# Patient Record
Sex: FEMALE | Race: OTHER
[De-identification: names, ages, dates, MRNs, and addresses within clinical notes are randomized per-mention and may not be internally consistent; named-entity substitution may affect disease eponyms.]

---

## 2018-01-29 ENCOUNTER — HOSPITAL ENCOUNTER (INPATIENT)
Dept: HOSPITAL 54 - ER | Age: 73
LOS: 3 days | Discharge: HOME | DRG: 720 | End: 2018-02-01
Attending: INTERNAL MEDICINE | Admitting: INTERNAL MEDICINE
Payer: MEDICAID

## 2018-01-29 VITALS — WEIGHT: 125.38 LBS | HEIGHT: 64 IN | BODY MASS INDEX: 21.4 KG/M2

## 2018-01-29 VITALS — DIASTOLIC BLOOD PRESSURE: 61 MMHG | SYSTOLIC BLOOD PRESSURE: 100 MMHG

## 2018-01-29 DIAGNOSIS — E11.22: ICD-10-CM

## 2018-01-29 DIAGNOSIS — J18.9: ICD-10-CM

## 2018-01-29 DIAGNOSIS — I95.9: ICD-10-CM

## 2018-01-29 DIAGNOSIS — N17.0: ICD-10-CM

## 2018-01-29 DIAGNOSIS — E86.1: ICD-10-CM

## 2018-01-29 DIAGNOSIS — I10: ICD-10-CM

## 2018-01-29 DIAGNOSIS — N10: ICD-10-CM

## 2018-01-29 DIAGNOSIS — I12.9: ICD-10-CM

## 2018-01-29 DIAGNOSIS — Z90.49: ICD-10-CM

## 2018-01-29 DIAGNOSIS — N18.9: ICD-10-CM

## 2018-01-29 DIAGNOSIS — K80.20: ICD-10-CM

## 2018-01-29 DIAGNOSIS — Z79.84: ICD-10-CM

## 2018-01-29 DIAGNOSIS — E87.1: ICD-10-CM

## 2018-01-29 DIAGNOSIS — Z79.899: ICD-10-CM

## 2018-01-29 DIAGNOSIS — B96.20: ICD-10-CM

## 2018-01-29 DIAGNOSIS — A41.9: Primary | ICD-10-CM

## 2018-01-29 DIAGNOSIS — E11.9: ICD-10-CM

## 2018-01-29 DIAGNOSIS — E83.42: ICD-10-CM

## 2018-01-29 DIAGNOSIS — E78.5: ICD-10-CM

## 2018-01-29 DIAGNOSIS — D63.8: ICD-10-CM

## 2018-01-29 LAB
ALBUMIN SERPL BCP-MCNC: 3.6 G/DL (ref 3.4–5)
ALP SERPL-CCNC: 94 U/L (ref 46–116)
ALT SERPL W P-5'-P-CCNC: 46 U/L (ref 12–78)
APPEARANCE UR: (no result)
APTT PPP: 26 SEC (ref 23–34)
AST SERPL W P-5'-P-CCNC: 36 U/L (ref 15–37)
BASOPHILS # BLD AUTO: 0 /CMM (ref 0–0.2)
BASOPHILS NFR BLD AUTO: 0.1 % (ref 0–2)
BILIRUB DIRECT SERPL-MCNC: 0.4 MG/DL (ref 0–0.2)
BILIRUB SERPL-MCNC: 1.1 MG/DL (ref 0.2–1)
BILIRUB UR QL STRIP: NEGATIVE
BUN SERPL-MCNC: 25 MG/DL (ref 7–18)
CALCIUM SERPL-MCNC: 10 MG/DL (ref 8.5–10.1)
CHLORIDE SERPL-SCNC: 97 MMOL/L (ref 98–107)
CO2 SERPL-SCNC: 27 MMOL/L (ref 21–32)
COLOR UR: YELLOW
CREAT SERPL-MCNC: 1.8 MG/DL (ref 0.6–1.3)
EOSINOPHIL # BLD AUTO: 0 /CMM (ref 0–0.7)
EOSINOPHIL NFR BLD AUTO: 0.1 % (ref 0–6)
GLUCOSE SERPL-MCNC: 209 MG/DL (ref 74–106)
GLUCOSE UR STRIP-MCNC: (no result) MG/DL
HCT VFR BLD AUTO: 40 % (ref 33–45)
HGB BLD-MCNC: 13.5 G/DL (ref 11.5–14.8)
HGB UR QL STRIP: (no result) ERY/UL
INR PPP: 0.97 (ref 0.87–1.13)
KETONES UR STRIP-MCNC: (no result) MG/DL
LEUKOCYTE ESTERASE UR QL STRIP: NEGATIVE
LIPASE SERPL-CCNC: 149 U/L (ref 73–393)
LYMPHOCYTES NFR BLD AUTO: 0.6 /CMM (ref 0.8–4.8)
LYMPHOCYTES NFR BLD AUTO: 2.8 % (ref 20–44)
LYMPHOCYTES NFR BLD MANUAL: 4 % (ref 16–48)
MCH RBC QN AUTO: 30 PG (ref 26–33)
MCHC RBC AUTO-ENTMCNC: 34 G/DL (ref 31–36)
MCV RBC AUTO: 88 FL (ref 82–100)
MONOCYTES NFR BLD AUTO: 0.5 /CMM (ref 0.1–1.3)
MONOCYTES NFR BLD AUTO: 2.5 % (ref 2–12)
MONOCYTES NFR BLD MANUAL: 7 % (ref 0–11)
NEUTROPHILS # BLD AUTO: 19.2 /CMM (ref 1.8–8.9)
NEUTROPHILS NFR BLD AUTO: 94.5 % (ref 43–81)
NEUTS BAND NFR BLD MANUAL: 14 % (ref 0–5)
NEUTS SEG NFR BLD MANUAL: 75 % (ref 42–76)
NITRITE UR QL STRIP: POSITIVE
PH UR STRIP: 7.5 [PH] (ref 5–8)
PLATELET # BLD AUTO: 230 /CMM (ref 150–450)
POTASSIUM SERPL-SCNC: 3.5 MMOL/L (ref 3.5–5.1)
PROT SERPL-MCNC: 8 G/DL (ref 6.4–8.2)
PROT UR QL STRIP: (no result) MG/DL
RBC # BLD AUTO: 4.56 MIL/UL (ref 4–5.2)
RDW COEFFICIENT OF VARIATION: 12.1 (ref 11.5–15)
SODIUM SERPL-SCNC: 132 MMOL/L (ref 136–145)
UROBILINOGEN UR STRIP-MCNC: 1 EU/DL
WBC #/AREA URNS HPF: (no result) /HPF (ref 0–3)
WBC NRBC COR # BLD AUTO: 20.3 K/UL (ref 4.3–11)

## 2018-01-29 PROCEDURE — A4606 OXYGEN PROBE USED W OXIMETER: HCPCS

## 2018-01-29 PROCEDURE — Z7610: HCPCS

## 2018-01-29 RX ADMIN — PIPERACILLIN SODIUM AND TAZOBACTAM SODIUM SCH MLS/HR: .25; 2 INJECTION, POWDER, LYOPHILIZED, FOR SOLUTION INTRAVENOUS at 23:47

## 2018-01-29 RX ADMIN — SODIUM CHLORIDE PRN MLS/HR: 9 INJECTION, SOLUTION INTRAVENOUS at 20:48

## 2018-01-29 RX ADMIN — Medication SCH EACH: at 23:47

## 2018-01-29 RX ADMIN — ENOXAPARIN SODIUM SCH MG: 30 INJECTION SUBCUTANEOUS at 20:49

## 2018-01-29 NOTE — NUR
RECEIVED REPORT FROM AM SHIFT RN MIR. PT RESTING IN BED QUIETLY, NO ACUTE 
DISTRESS NOTED, RESP EVEN AND UNLABORED. PT DENIES PAIN OR DISCOMFORT AT THIS 
TIME. PT VERBALIZE RELIEF OF N/V. CALL MAIRA WHEELER. WILL CONTINUE TO 
MONITOR PT CLOSELY.

## 2018-01-29 NOTE — NUR
Patient is resting comfortably in bed with eyes closed. Easily aroused. VSS. 
REPORT GIVEN TO PM NURSES FOR BALJINDER

## 2018-01-29 NOTE — NUR
tele rn note



received patient awake alert and oriented. Patient denies any pain or discomfort at this 
time. IV site intact with no redness noted. Skin intact, with no breakdown or bruising 
noted. All belongings checked and accounted for. Oriented patient to room and to unit. 
Waiting for md orders. Bed locked and in lowest position. Side rails up, call light within 
reach. Will continue to monitor.

## 2018-01-29 NOTE — NUR
ALEXANDER FROM HOME DT ALTERED MENTAL STATUS. PATIENT RECEIVED AWAKE AND ALERT. 
APPEARS IN NO DISTRESS. RESPIRATION EVEN AND UNLABORED. PATIENT ABLE TO 
VERBALLY RESPONSIVE HOWEVER PATIENT RESPONDS SLOW. PATIENT REMAINS LETHRGIC. 
PER PT SHE HAS 1 WPIODE OF VOMITTING AND FELT DIZZY APPROXIMATELY 1.5 HR AGO. 
PATIENT IS SATING WELL ON ROOM AIR, AFEBRILE. VSS. GOWNED PT AND PLACED ON TELE 
MONITOR, PENDING MD EVALUATUION

## 2018-01-30 VITALS — DIASTOLIC BLOOD PRESSURE: 59 MMHG | SYSTOLIC BLOOD PRESSURE: 124 MMHG

## 2018-01-30 VITALS — DIASTOLIC BLOOD PRESSURE: 71 MMHG | SYSTOLIC BLOOD PRESSURE: 138 MMHG

## 2018-01-30 VITALS — SYSTOLIC BLOOD PRESSURE: 98 MMHG | DIASTOLIC BLOOD PRESSURE: 53 MMHG

## 2018-01-30 VITALS — SYSTOLIC BLOOD PRESSURE: 100 MMHG | DIASTOLIC BLOOD PRESSURE: 56 MMHG

## 2018-01-30 VITALS — SYSTOLIC BLOOD PRESSURE: 116 MMHG | DIASTOLIC BLOOD PRESSURE: 58 MMHG

## 2018-01-30 LAB
BASOPHILS # BLD AUTO: 0 /CMM (ref 0–0.2)
BASOPHILS NFR BLD AUTO: 0.2 % (ref 0–2)
BUN SERPL-MCNC: 29 MG/DL (ref 7–18)
CALCIUM SERPL-MCNC: 8.8 MG/DL (ref 8.5–10.1)
CHLORIDE SERPL-SCNC: 103 MMOL/L (ref 98–107)
CHOLEST SERPL-MCNC: 157 MG/DL (ref ?–200)
CO2 SERPL-SCNC: 24 MMOL/L (ref 21–32)
CREAT SERPL-MCNC: 2.2 MG/DL (ref 0.6–1.3)
CREAT UR-MCNC: 40.5 MG/DL (ref 30–125)
EOSINOPHIL # BLD AUTO: 0.1 /CMM (ref 0–0.7)
EOSINOPHIL NFR BLD AUTO: 0.6 % (ref 0–6)
GLUCOSE SERPL-MCNC: 59 MG/DL (ref 74–106)
HCT VFR BLD AUTO: 34 % (ref 33–45)
HDLC SERPL-MCNC: 57 MG/DL (ref 40–60)
HGB BLD-MCNC: 11.8 G/DL (ref 11.5–14.8)
LDLC SERPL DIRECT ASSAY-MCNC: 90 MG/DL (ref 0–99)
LYMPHOCYTES NFR BLD AUTO: 10.9 % (ref 20–44)
LYMPHOCYTES NFR BLD AUTO: 2.1 /CMM (ref 0.8–4.8)
MAGNESIUM SERPL-MCNC: 1.9 MG/DL (ref 1.8–2.4)
MCH RBC QN AUTO: 30 PG (ref 26–33)
MCHC RBC AUTO-ENTMCNC: 34 G/DL (ref 31–36)
MCV RBC AUTO: 88 FL (ref 82–100)
MONOCYTES NFR BLD AUTO: 1.1 /CMM (ref 0.1–1.3)
MONOCYTES NFR BLD AUTO: 5.8 % (ref 2–12)
NEUTROPHILS # BLD AUTO: 16 /CMM (ref 1.8–8.9)
NEUTROPHILS NFR BLD AUTO: 82.5 % (ref 43–81)
PHOSPHATE SERPL-MCNC: 4.2 MG/DL (ref 2.5–4.9)
PLATELET # BLD AUTO: 203 /CMM (ref 150–450)
POTASSIUM SERPL-SCNC: 3.8 MMOL/L (ref 3.5–5.1)
RBC # BLD AUTO: 3.9 MIL/UL (ref 4–5.2)
RDW COEFFICIENT OF VARIATION: 12.7 (ref 11.5–15)
SODIUM SERPL-SCNC: 137 MMOL/L (ref 136–145)
SODIUM UR-SCNC: 99 MMOL/L (ref 40–220)
TRIGL SERPL-MCNC: 122 MG/DL (ref 30–150)
WBC NRBC COR # BLD AUTO: 19.4 K/UL (ref 4.3–11)

## 2018-01-30 RX ADMIN — SODIUM CHLORIDE PRN MLS/HR: 9 INJECTION, SOLUTION INTRAVENOUS at 10:48

## 2018-01-30 RX ADMIN — CLONIDINE HYDROCHLORIDE SCH MG: 0.1 TABLET ORAL at 09:17

## 2018-01-30 RX ADMIN — PIPERACILLIN SODIUM AND TAZOBACTAM SODIUM SCH MLS/HR: .25; 2 INJECTION, POWDER, LYOPHILIZED, FOR SOLUTION INTRAVENOUS at 23:37

## 2018-01-30 RX ADMIN — Medication SCH EACH: at 09:17

## 2018-01-30 RX ADMIN — CLONIDINE HYDROCHLORIDE SCH MG: 0.1 TABLET ORAL at 17:43

## 2018-01-30 RX ADMIN — PIPERACILLIN SODIUM AND TAZOBACTAM SODIUM SCH MLS/HR: .25; 2 INJECTION, POWDER, LYOPHILIZED, FOR SOLUTION INTRAVENOUS at 05:26

## 2018-01-30 RX ADMIN — Medication SCH EACH: at 22:07

## 2018-01-30 RX ADMIN — PIPERACILLIN SODIUM AND TAZOBACTAM SODIUM SCH MLS/HR: .25; 2 INJECTION, POWDER, LYOPHILIZED, FOR SOLUTION INTRAVENOUS at 12:37

## 2018-01-30 RX ADMIN — ENOXAPARIN SODIUM SCH MG: 30 INJECTION SUBCUTANEOUS at 22:07

## 2018-01-30 RX ADMIN — PIPERACILLIN SODIUM AND TAZOBACTAM SODIUM SCH MLS/HR: .25; 2 INJECTION, POWDER, LYOPHILIZED, FOR SOLUTION INTRAVENOUS at 18:11

## 2018-01-30 RX ADMIN — SODIUM CHLORIDE PRN MLS/HR: 9 INJECTION, SOLUTION INTRAVENOUS at 23:37

## 2018-01-30 RX ADMIN — INSULIN HUMAN PRN UNIT: 100 INJECTION, SOLUTION PARENTERAL at 22:08

## 2018-01-30 RX ADMIN — Medication SCH EACH: at 12:11

## 2018-01-30 RX ADMIN — Medication SCH EACH: at 17:42

## 2018-01-30 NOTE — NUR
MSRN

ASSISTED TO RESTROOM, VOIDED FREELY.  HFR, DIZZY ON/OFF. SAFETY PRECAUTIONS EMPHASIZED, WELL 
UNDERSTOOD. ALL NEEDS MET.

## 2018-01-31 VITALS — SYSTOLIC BLOOD PRESSURE: 123 MMHG | DIASTOLIC BLOOD PRESSURE: 65 MMHG

## 2018-01-31 VITALS — SYSTOLIC BLOOD PRESSURE: 112 MMHG | DIASTOLIC BLOOD PRESSURE: 58 MMHG

## 2018-01-31 VITALS — SYSTOLIC BLOOD PRESSURE: 133 MMHG | DIASTOLIC BLOOD PRESSURE: 62 MMHG

## 2018-01-31 VITALS — DIASTOLIC BLOOD PRESSURE: 62 MMHG | SYSTOLIC BLOOD PRESSURE: 144 MMHG

## 2018-01-31 VITALS — SYSTOLIC BLOOD PRESSURE: 131 MMHG | DIASTOLIC BLOOD PRESSURE: 67 MMHG

## 2018-01-31 LAB
BASOPHILS # BLD AUTO: 0 /CMM (ref 0–0.2)
BASOPHILS NFR BLD AUTO: 0.3 % (ref 0–2)
BUN SERPL-MCNC: 18 MG/DL (ref 7–18)
CALCIUM SERPL-MCNC: 8.3 MG/DL (ref 8.5–10.1)
CHLORIDE SERPL-SCNC: 101 MMOL/L (ref 98–107)
CO2 SERPL-SCNC: 25 MMOL/L (ref 21–32)
CREAT SERPL-MCNC: 1.5 MG/DL (ref 0.6–1.3)
EOSINOPHIL # BLD AUTO: 0.1 /CMM (ref 0–0.7)
EOSINOPHIL NFR BLD AUTO: 0.8 % (ref 0–6)
GLUCOSE SERPL-MCNC: 92 MG/DL (ref 74–106)
HCT VFR BLD AUTO: 34 % (ref 33–45)
HGB BLD-MCNC: 11.5 G/DL (ref 11.5–14.8)
LYMPHOCYTES NFR BLD AUTO: 1.4 /CMM (ref 0.8–4.8)
LYMPHOCYTES NFR BLD AUTO: 12.5 % (ref 20–44)
MAGNESIUM SERPL-MCNC: 1.8 MG/DL (ref 1.8–2.4)
MCH RBC QN AUTO: 30 PG (ref 26–33)
MCHC RBC AUTO-ENTMCNC: 34 G/DL (ref 31–36)
MCV RBC AUTO: 88 FL (ref 82–100)
MONOCYTES NFR BLD AUTO: 1.1 /CMM (ref 0.1–1.3)
MONOCYTES NFR BLD AUTO: 9.8 % (ref 2–12)
NEUTROPHILS # BLD AUTO: 8.7 /CMM (ref 1.8–8.9)
NEUTROPHILS NFR BLD AUTO: 76.6 % (ref 43–81)
PHOSPHATE SERPL-MCNC: 2.6 MG/DL (ref 2.5–4.9)
PLATELET # BLD AUTO: 184 /CMM (ref 150–450)
POTASSIUM SERPL-SCNC: 3.6 MMOL/L (ref 3.5–5.1)
RBC # BLD AUTO: 3.82 MIL/UL (ref 4–5.2)
RDW COEFFICIENT OF VARIATION: 11.5 (ref 11.5–15)
SODIUM SERPL-SCNC: 136 MMOL/L (ref 136–145)
WBC NRBC COR # BLD AUTO: 11.3 K/UL (ref 4.3–11)

## 2018-01-31 RX ADMIN — Medication SCH EACH: at 22:00

## 2018-01-31 RX ADMIN — INSULIN HUMAN PRN UNIT: 100 INJECTION, SOLUTION PARENTERAL at 23:25

## 2018-01-31 RX ADMIN — PIPERACILLIN SODIUM AND TAZOBACTAM SODIUM SCH MLS/HR: .25; 2 INJECTION, POWDER, LYOPHILIZED, FOR SOLUTION INTRAVENOUS at 06:24

## 2018-01-31 RX ADMIN — CLONIDINE HYDROCHLORIDE SCH MG: 0.1 TABLET ORAL at 08:48

## 2018-01-31 RX ADMIN — INSULIN HUMAN PRN UNIT: 100 INJECTION, SOLUTION PARENTERAL at 12:09

## 2018-01-31 RX ADMIN — ENOXAPARIN SODIUM SCH MG: 30 INJECTION SUBCUTANEOUS at 21:25

## 2018-01-31 RX ADMIN — SODIUM CHLORIDE PRN MLS/HR: 9 INJECTION, SOLUTION INTRAVENOUS at 12:05

## 2018-01-31 RX ADMIN — INSULIN HUMAN PRN UNIT: 100 INJECTION, SOLUTION PARENTERAL at 17:13

## 2018-01-31 RX ADMIN — PIPERACILLIN SODIUM AND TAZOBACTAM SODIUM SCH MLS/HR: .25; 2 INJECTION, POWDER, LYOPHILIZED, FOR SOLUTION INTRAVENOUS at 12:06

## 2018-01-31 RX ADMIN — Medication SCH EACH: at 06:49

## 2018-01-31 RX ADMIN — CLONIDINE HYDROCHLORIDE SCH MG: 0.1 TABLET ORAL at 16:48

## 2018-01-31 RX ADMIN — Medication SCH EACH: at 17:13

## 2018-01-31 RX ADMIN — Medication SCH EACH: at 12:02

## 2018-01-31 RX ADMIN — PIPERACILLIN SODIUM AND TAZOBACTAM SODIUM SCH MLS/HR: .25; 2 INJECTION, POWDER, LYOPHILIZED, FOR SOLUTION INTRAVENOUS at 17:49

## 2018-01-31 NOTE — NUR
MS/LVN;

RECEIVED PT IN BED AWAKE TALKING ON HER CELL PHONE. BREATHING NON LABORED. DENIES PAIN. IVF 
ON PROGRESS. BED ON LOWER POSITION AND LOCKED FOR SAFETY. SIDE RAILS X 2 ARE UP FOR SAFETY. 
CONTINUE TO MONITOR. CALL LIGHT WITHIN REACH.

## 2018-01-31 NOTE — NUR
m/s lvn: nephro f/u



seen by dr. farley at this time. lunch served. hob elevated. will continue to monitor.

## 2018-01-31 NOTE — NUR
MSRN

ORTHOSTATIC BP LYING 131/67   HR OF 69,     SITTING  /65 , HR OF 77,   STANDING  BP 
144/62  HR OF 80.  BS 92, NO COVERAGE

## 2018-01-31 NOTE — NUR
m/s lvn: notes



pt in bed sounds asleep. needs attended. no apparent distress noted. will continue to 
monitor.

## 2018-01-31 NOTE — NUR
m/s lvn: notes



resting comfortable in bed. continue on ivf, infusing well. will continue to monitor.

## 2018-01-31 NOTE — NUR
m/s lvn: initial assessment



received pt in bed awake, a/ox4. no c/o dysuria. pt voiding well. instructed to call for 
assistance. will continue to monitor.

## 2018-02-01 VITALS — SYSTOLIC BLOOD PRESSURE: 135 MMHG | DIASTOLIC BLOOD PRESSURE: 75 MMHG

## 2018-02-01 VITALS — DIASTOLIC BLOOD PRESSURE: 72 MMHG | SYSTOLIC BLOOD PRESSURE: 130 MMHG

## 2018-02-01 VITALS — DIASTOLIC BLOOD PRESSURE: 74 MMHG | SYSTOLIC BLOOD PRESSURE: 133 MMHG

## 2018-02-01 LAB
BASOPHILS # BLD AUTO: 0 /CMM (ref 0–0.2)
BASOPHILS NFR BLD AUTO: 0.3 % (ref 0–2)
BUN SERPL-MCNC: 15 MG/DL (ref 7–18)
CALCIUM SERPL-MCNC: 8.7 MG/DL (ref 8.5–10.1)
CHLORIDE SERPL-SCNC: 103 MMOL/L (ref 98–107)
CO2 SERPL-SCNC: 22 MMOL/L (ref 21–32)
CREAT SERPL-MCNC: 1.5 MG/DL (ref 0.6–1.3)
EOSINOPHIL # BLD AUTO: 0.1 /CMM (ref 0–0.7)
EOSINOPHIL NFR BLD AUTO: 1.1 % (ref 0–6)
GLUCOSE SERPL-MCNC: 114 MG/DL (ref 74–106)
HCT VFR BLD AUTO: 33 % (ref 33–45)
HGB BLD-MCNC: 11.2 G/DL (ref 11.5–14.8)
LYMPHOCYTES NFR BLD AUTO: 1.3 /CMM (ref 0.8–4.8)
LYMPHOCYTES NFR BLD AUTO: 15.7 % (ref 20–44)
MAGNESIUM SERPL-MCNC: 1.7 MG/DL (ref 1.8–2.4)
MCH RBC QN AUTO: 30 PG (ref 26–33)
MCHC RBC AUTO-ENTMCNC: 34 G/DL (ref 31–36)
MCV RBC AUTO: 88 FL (ref 82–100)
MONOCYTES NFR BLD AUTO: 0.9 /CMM (ref 0.1–1.3)
MONOCYTES NFR BLD AUTO: 10.8 % (ref 2–12)
NEUTROPHILS # BLD AUTO: 5.9 /CMM (ref 1.8–8.9)
NEUTROPHILS NFR BLD AUTO: 72.1 % (ref 43–81)
PHOSPHATE SERPL-MCNC: 2.6 MG/DL (ref 2.5–4.9)
PLATELET # BLD AUTO: 182 /CMM (ref 150–450)
POTASSIUM SERPL-SCNC: 3.8 MMOL/L (ref 3.5–5.1)
RBC # BLD AUTO: 3.73 MIL/UL (ref 4–5.2)
RDW COEFFICIENT OF VARIATION: 12.1 (ref 11.5–15)
SODIUM SERPL-SCNC: 135 MMOL/L (ref 136–145)
WBC NRBC COR # BLD AUTO: 8.1 K/UL (ref 4.3–11)

## 2018-02-01 RX ADMIN — CLONIDINE HYDROCHLORIDE SCH MG: 0.1 TABLET ORAL at 17:00

## 2018-02-01 RX ADMIN — MAGNESIUM SULFATE IN DEXTROSE SCH MLS/HR: 10 INJECTION, SOLUTION INTRAVENOUS at 13:13

## 2018-02-01 RX ADMIN — Medication SCH EACH: at 17:30

## 2018-02-01 RX ADMIN — SODIUM CHLORIDE PRN MLS/HR: 9 INJECTION, SOLUTION INTRAVENOUS at 02:19

## 2018-02-01 RX ADMIN — PIPERACILLIN SODIUM AND TAZOBACTAM SODIUM SCH MLS/HR: .25; 2 INJECTION, POWDER, LYOPHILIZED, FOR SOLUTION INTRAVENOUS at 00:02

## 2018-02-01 RX ADMIN — MAGNESIUM SULFATE IN DEXTROSE SCH MLS/HR: 10 INJECTION, SOLUTION INTRAVENOUS at 12:06

## 2018-02-01 RX ADMIN — Medication SCH EACH: at 05:46

## 2018-02-01 RX ADMIN — Medication SCH EACH: at 12:06

## 2018-02-01 RX ADMIN — CLONIDINE HYDROCHLORIDE SCH MG: 0.1 TABLET ORAL at 09:23

## 2018-02-01 RX ADMIN — PIPERACILLIN SODIUM AND TAZOBACTAM SODIUM SCH MLS/HR: .25; 2 INJECTION, POWDER, LYOPHILIZED, FOR SOLUTION INTRAVENOUS at 05:13

## 2018-02-01 RX ADMIN — PIPERACILLIN SODIUM AND TAZOBACTAM SODIUM SCH MLS/HR: .25; 2 INJECTION, POWDER, LYOPHILIZED, FOR SOLUTION INTRAVENOUS at 12:06

## 2018-02-01 NOTE — NUR
MS RN

RECEIVED ON BED, AWAKE,ALERT,ORIENTED X3,NOT IN ANY FORM OF DISTRESS, RESPIRATIONS EVEN AND 
UNLABORED,NO SOB NOTED, LUNGS ARE CLEAR,ABDOMEN SOFT, POSITIVE BOWEL SOUNDS, DENIES PAIN AT 
THIS TIME, WILL MONITOR PATIENT'S CONDITION, WILL MONITOR PATIENT.

## 2018-02-01 NOTE — NUR
ms rn

patient went home accompanied w/ friend,prescription of levaquin given.no distress noted,all 
needs attended.

## 2018-02-01 NOTE — NUR
MS/LVN;

SLEPT FAIRLY. IVF ON PROGRESS. DENIES PAIN. WILL ENDORSE TO THE DAY SHIFT RN FOR CONTINUITY 
OF CARE.

## 2018-02-01 NOTE — NUR
RN NOTES:





 PATIENT RESTING IN BED. NONLABORED BREATHING NOTED. PATIENT AOX3. PATIENT COMPLAINING OF 
DIZZINESS,STATING THAT SHE NEEDS A SNACK. BLOOD SUGAR WNL. PATIENT GIVEN JUICE AND CRACKERS. 
BED IN LOWEST LOCKED POSITION. CALL LIGHT WITHIN REACH. WILL CONTINUE TO MONITOR
